# Patient Record
Sex: FEMALE | Race: WHITE | Employment: FULL TIME | URBAN - METROPOLITAN AREA
[De-identification: names, ages, dates, MRNs, and addresses within clinical notes are randomized per-mention and may not be internally consistent; named-entity substitution may affect disease eponyms.]

---

## 2022-03-24 ENCOUNTER — HOSPITAL ENCOUNTER (OUTPATIENT)
Age: 32
Discharge: HOME | End: 2022-03-24
Payer: COMMERCIAL

## 2022-03-24 LAB
ABO/RH: NORMAL
ANTIBODY SCREEN: NORMAL
HCT VFR BLD CALC: 24.9 % (ref 36–48)
HEMATOLOGY PATH CONSULT: YES
HEMOGLOBIN: 7.5 G/DL (ref 12–16)
MCH RBC QN AUTO: 18.4 PG (ref 26–34)
MCHC RBC AUTO-ENTMCNC: 30.3 G/DL (ref 31–36)
MCV RBC AUTO: 60.6 FL (ref 80–100)
PDW BLD-RTO: 18.7 % (ref 12.4–15.4)
PLATELET # BLD: 365 K/UL (ref 135–450)
PLATELET SLIDE REVIEW: ADEQUATE
PMV BLD AUTO: 7.9 FL (ref 5–10.5)
RBC # BLD: 4.11 M/UL (ref 4–5.2)
SLIDE REVIEW: ABNORMAL
WBC # BLD: 7.3 K/UL (ref 4–11)

## 2022-03-24 PROCEDURE — 86850 RBC ANTIBODY SCREEN: CPT

## 2022-03-24 PROCEDURE — 85027 COMPLETE CBC AUTOMATED: CPT

## 2022-03-24 PROCEDURE — 36415 COLL VENOUS BLD VENIPUNCTURE: CPT

## 2022-03-24 PROCEDURE — 86901 BLOOD TYPING SEROLOGIC RH(D): CPT

## 2022-03-24 PROCEDURE — 86900 BLOOD TYPING SEROLOGIC ABO: CPT

## 2022-03-25 LAB — HEMATOLOGY PATH CONSULT: NORMAL

## 2022-04-19 ENCOUNTER — HOSPITAL ENCOUNTER (EMERGENCY)
Age: 32
Discharge: HOME | End: 2022-04-20
Payer: COMMERCIAL

## 2022-04-19 VITALS
TEMPERATURE: 97.8 F | RESPIRATION RATE: 20 BRPM | OXYGEN SATURATION: 99 % | SYSTOLIC BLOOD PRESSURE: 107 MMHG | HEART RATE: 91 BPM | WEIGHT: 215 LBS | DIASTOLIC BLOOD PRESSURE: 66 MMHG

## 2022-04-19 DIAGNOSIS — R31.9 URINARY TRACT INFECTION WITH HEMATURIA, SITE UNSPECIFIED: ICD-10-CM

## 2022-04-19 DIAGNOSIS — N39.0 URINARY TRACT INFECTION WITH HEMATURIA, SITE UNSPECIFIED: ICD-10-CM

## 2022-04-19 DIAGNOSIS — N93.9 ABNORMAL VAGINAL BLEEDING: Primary | ICD-10-CM

## 2022-04-19 LAB
A/G RATIO: 1.6 (ref 1.1–2.2)
ALBUMIN SERPL-MCNC: 4.5 G/DL (ref 3.4–5)
ALP BLD-CCNC: 96 U/L (ref 40–129)
ALT SERPL-CCNC: 20 U/L (ref 10–40)
AMORPHOUS: ABNORMAL /HPF
ANION GAP SERPL CALCULATED.3IONS-SCNC: 12 MMOL/L (ref 3–16)
ANISOCYTOSIS: ABNORMAL
AST SERPL-CCNC: 16 U/L (ref 15–37)
BACTERIA: ABNORMAL /HPF
BASOPHILS ABSOLUTE: 0 K/UL (ref 0–0.2)
BASOPHILS RELATIVE PERCENT: 0.3 %
BILIRUB SERPL-MCNC: 0.3 MG/DL (ref 0–1)
BILIRUBIN URINE: NEGATIVE
BLOOD, URINE: ABNORMAL
BUN BLDV-MCNC: 14 MG/DL (ref 7–20)
CALCIUM SERPL-MCNC: 9.1 MG/DL (ref 8.3–10.6)
CHLORIDE BLD-SCNC: 100 MMOL/L (ref 99–110)
CLARITY: CLEAR
CO2: 21 MMOL/L (ref 21–32)
COLOR: YELLOW
CREAT SERPL-MCNC: 0.7 MG/DL (ref 0.6–1.1)
EOSINOPHILS ABSOLUTE: 0 K/UL (ref 0–0.6)
EOSINOPHILS RELATIVE PERCENT: 0.2 %
EPITHELIAL CELLS, UA: ABNORMAL /HPF (ref 0–5)
GFR AFRICAN AMERICAN: >60
GFR NON-AFRICAN AMERICAN: >60
GLUCOSE BLD-MCNC: 150 MG/DL (ref 70–99)
GLUCOSE URINE: NEGATIVE MG/DL
GONADOTROPIN, CHORIONIC (HCG) QUANT: <5 MIU/ML
HCT VFR BLD CALC: 27.2 % (ref 36–48)
HEMATOLOGY PATH CONSULT: NO
HEMOGLOBIN: 8.2 G/DL (ref 12–16)
KETONES, URINE: NEGATIVE MG/DL
LEUKOCYTE ESTERASE, URINE: NEGATIVE
LYMPHOCYTES ABSOLUTE: 1.8 K/UL (ref 1–5.1)
LYMPHOCYTES RELATIVE PERCENT: 16.7 %
MACROCYTES: ABNORMAL
MCH RBC QN AUTO: 20.1 PG (ref 26–34)
MCHC RBC AUTO-ENTMCNC: 30.3 G/DL (ref 31–36)
MCV RBC AUTO: 66.3 FL (ref 80–100)
MICROSCOPIC EXAMINATION: YES
MONOCYTES ABSOLUTE: 0.5 K/UL (ref 0–1.3)
MONOCYTES RELATIVE PERCENT: 4.4 %
NEUTROPHILS ABSOLUTE: 8.6 K/UL (ref 1.7–7.7)
NEUTROPHILS RELATIVE PERCENT: 78.4 %
NITRITE, URINE: NEGATIVE
OVALOCYTES: ABNORMAL
PDW BLD-RTO: 26.8 % (ref 12.4–15.4)
PH UA: 5.5 (ref 5–8)
PLATELET # BLD: 476 K/UL (ref 135–450)
PLATELET SLIDE REVIEW: ABNORMAL
PMV BLD AUTO: 8.4 FL (ref 5–10.5)
POIKILOCYTES: ABNORMAL
POLYCHROMASIA: ABNORMAL
POTASSIUM SERPL-SCNC: 3.9 MMOL/L (ref 3.5–5.1)
PROTEIN UA: NEGATIVE MG/DL
RBC # BLD: 4.11 M/UL (ref 4–5.2)
RBC UA: ABNORMAL /HPF (ref 0–4)
SLIDE REVIEW: ABNORMAL
SODIUM BLD-SCNC: 133 MMOL/L (ref 136–145)
SPECIFIC GRAVITY UA: >=1.03 (ref 1–1.03)
SPECIMEN STATUS: NORMAL
SPHEROCYTES: ABNORMAL
TOTAL PROTEIN: 7.3 G/DL (ref 6.4–8.2)
URINE TYPE: ABNORMAL
UROBILINOGEN, URINE: 0.2 E.U./DL
WBC # BLD: 10.9 K/UL (ref 4–11)
WBC UA: ABNORMAL /HPF (ref 0–5)

## 2022-04-19 PROCEDURE — 85025 COMPLETE CBC W/AUTO DIFF WBC: CPT

## 2022-04-19 PROCEDURE — 80053 COMPREHEN METABOLIC PANEL: CPT

## 2022-04-19 PROCEDURE — 81001 URINALYSIS AUTO W/SCOPE: CPT

## 2022-04-19 PROCEDURE — 6370000000 HC RX 637 (ALT 250 FOR IP): Performed by: PHYSICIAN ASSISTANT

## 2022-04-19 PROCEDURE — 99284 EMERGENCY DEPT VISIT MOD MDM: CPT

## 2022-04-19 PROCEDURE — 84702 CHORIONIC GONADOTROPIN TEST: CPT

## 2022-04-19 PROCEDURE — 2580000003 HC RX 258: Performed by: PHYSICIAN ASSISTANT

## 2022-04-19 PROCEDURE — 87086 URINE CULTURE/COLONY COUNT: CPT

## 2022-04-19 RX ORDER — CEPHALEXIN 250 MG/1
500 CAPSULE ORAL 2 TIMES DAILY
Qty: 20 CAPSULE | Refills: 0 | Status: SHIPPED | OUTPATIENT
Start: 2022-04-19 | End: 2022-04-24

## 2022-04-19 RX ORDER — CEPHALEXIN 250 MG/1
500 CAPSULE ORAL EVERY 6 HOURS SCHEDULED
Status: DISCONTINUED | OUTPATIENT
Start: 2022-04-20 | End: 2022-04-20 | Stop reason: HOSPADM

## 2022-04-19 RX ORDER — 0.9 % SODIUM CHLORIDE 0.9 %
1000 INTRAVENOUS SOLUTION INTRAVENOUS ONCE
Status: COMPLETED | OUTPATIENT
Start: 2022-04-19 | End: 2022-04-20

## 2022-04-19 RX ADMIN — CEPHALEXIN 500 MG: 250 CAPSULE ORAL at 23:10

## 2022-04-19 RX ADMIN — SODIUM CHLORIDE 1000 ML: 9 INJECTION, SOLUTION INTRAVENOUS at 21:17

## 2022-04-19 ASSESSMENT — PAIN SCALES - GENERAL: PAINLEVEL_OUTOF10: 3

## 2022-04-20 NOTE — ED PROVIDER NOTES
[facility] Emergency Department    CHIEF COMPLAINT  Abdominal Pain (pt comes from home for vaginal bleeding since mingbuary was adnitted last week for low hbg, today bleeding through over night pad ever 2 hours, complaints of dizziness)      SHARED SERVICE VISIT  Evaluated by MARTÍN. My supervising physician was available for consultation. HISTORY OF PRESENT ILLNESS  Selwyn Kilgore is a 27 y.o. female who presents to the ED complaining of episode of abdominal pain. Patient dropped off by her wife today for evaluation. Patient reports history of uterine fibroids and states that she has had some heavy vaginal bleeding intermittently since February. Reports that she was seen and evaluated last week at local hospital where she received blood and iron transfusions. Reports that bleeding has persisted. She has been seen and evaluated by her obstetrician who placed her on birth control to attempt to regulate periods although that was unsuccessful. States that she was also recently placed on progesterone without improvement of bleeding. Does have a obstetrician appointment scheduled for 830 tomorrow morning. Reports that she did have some very sharp and stabbing periumbilical pain earlier today which caused her to vomit. States that that has resolved and she is currently asymptomatic. No diarrhea or constipation. She has had no urinary symptoms. Denies fevers or chills. Short of breath with exertion. Occasional lightheadedness when standing. No headaches or confusion. No other complaints, modifying factors or associated symptoms. Nursing notes reviewed. No past medical history on file. No past surgical history on file. No family history on file.   Social History     Socioeconomic History    Marital status:      Spouse name: Not on file    Number of children: Not on file    Years of education: Not on file    Highest education level: Not on file   Occupational History    Not on file   Tobacco Use    Smoking status: Not on file    Smokeless tobacco: Not on file   Substance and Sexual Activity    Alcohol use: Not on file    Drug use: Not on file    Sexual activity: Not on file   Other Topics Concern    Not on file   Social History Narrative    Not on file     Social Determinants of Health     Financial Resource Strain:     Difficulty of Paying Living Expenses: Not on file   Food Insecurity:     Worried About Running Out of Food in the Last Year: Not on file    David of Food in the Last Year: Not on file   Transportation Needs:     Lack of Transportation (Medical): Not on file    Lack of Transportation (Non-Medical): Not on file   Physical Activity:     Days of Exercise per Week: Not on file    Minutes of Exercise per Session: Not on file   Stress:     Feeling of Stress : Not on file   Social Connections:     Frequency of Communication with Friends and Family: Not on file    Frequency of Social Gatherings with Friends and Family: Not on file    Attends Judaism Services: Not on file    Active Member of [address] [place] [address] or Organizations: Not on file    Attends Club or Organization Meetings: Not on file    Marital Status: Not on file   Intimate Partner Violence:     Fear of Current or Ex-Partner: Not on file    Emotionally Abused: Not on file    Physically Abused: Not on file    Sexually Abused: Not on file   Housing Stability:     Unable to Pay for Housing in the Last Year: Not on file    Number of [place] in the Last Year: Not on file    Unstable Housing in the Last Year: Not on file     Current Facility-Administered Medications   Medication Dose Route Frequency Provider Last Rate Last Admin    0.9 % sodium chloride bolus  1,000 mL IntraVENous Once MARY Robert 1,000 mL/hr at 04/19/22 2117 1,000 mL at 04/19/22 2117     No current outpatient medications on file.      Allergies   Allergen Reactions    Codeine Dizziness or Vertigo       REVIEW OF SYSTEMS  10 systems reviewed, pertinent positives per HPI otherwise noted to be negative    PHYSICAL EXAM  BP (!) 141/91   Pulse 113   Temp 97.8 °F (36.6 °C) (Oral)   Resp 18   Wt 215 lb (97.5 kg)   SpO2 100%   GENERAL APPEARANCE: Awake and alert. Cooperative. No acute distress. HEAD: Normocephalic. Atraumatic. EYES: PERRL. EOM's grossly intact. ENT: Mucous membranes are moist.   NECK: Supple. No JVD. No tracheal tenderness or deviation. No crepitus. HEART: Tachycardic. No murmurs. LUNGS: Respirations unlabored. CTAB. Good air exchange. Speaking comfortably in full sentences. No wheezing, rhonchi, rales. ABDOMEN: Soft. Non-distended. Mild generalized nonfocal abdominal tenderness without rigidity, guarding or rebound. Negative Childs's, McBurney's and Rovsing's. No fluids or ascites. No hernias or masses. Bowel sounds normal quadrants. No CVA tenderness. EXTREMITIES: No peripheral edema. Moves all extremities equally. All extremities neurovascularly intact. SKIN: Warm and dry. No acute rashes. NEUROLOGICAL: Alert and oriented. CN's 2-12 intact. No gross facial drooping. Strength 5/5, sensation intact. PSYCHIATRIC: Normal mood and affect. ED COURSE  Pain control was not required while here in the emergency department. Triage vitals showing some tachycardia pulse rate 113. Remainder vitals stable. A CBC with an H&H at 8.2 and 27.2 respectively. CMP unremarkable. Lipase normal.  hCG negative. Urinalysis showing 6-9 white blood cells with 1+ bacteria. Urine cultures pending. Given dose of Keflex. We will at this time treat UTI. As for patient's ongoing abnormal uterine bleeding there is no evidence for anemia requiring transfusions. Tachycardia has improved status post fluid infusion. Patient has follow-up with her obstetrician tomorrow. Will be discharged home with recommendations to keep that appointment.   We have discussed return precautions otherwise patient is in agreement and comfortable with discharge. Discussion was had with Ms. Hammad Carrillo regarding abnormal uterine bleeding, ED findings and recommendations for follow-up. Risk management discussed and shared decision making had with patient and/or surrogate. All questions were answered. Patient will follow up with OB/GYN tomorrow for further evaluation/treatment. All questions answered. Patient will return to ED for new/worsening symptoms. No medications prescribed at discharge.     MDM  Results for orders placed or performed during the hospital encounter of 04/19/22   CBC with Auto Differential   Result Value Ref Range    WBC 10.9 4.0 - 11.0 K/uL    RBC 4.11 4.00 - 5.20 M/uL    Hemoglobin 8.2 (L) 12.0 - 16.0 g/dL    Hematocrit 27.2 (L) 36.0 - 48.0 %    MCV 66.3 (L) 80.0 - 100.0 fL    MCH 20.1 (L) 26.0 - 34.0 pg    MCHC 30.3 (L) 31.0 - 36.0 g/dL    RDW 26.8 (H) 12.4 - 15.4 %    Platelets 167 (H) 236 - 450 K/uL    MPV 8.4 5.0 - 10.5 fL    PLATELET SLIDE REVIEW Increased     SLIDE REVIEW see below     Path Consult No     Neutrophils % 78.4 %    Lymphocytes % 16.7 %    Monocytes % 4.4 %    Eosinophils % 0.2 %    Basophils % 0.3 %    Neutrophils Absolute 8.6 (H) 1.7 - 7.7 K/uL    Lymphocytes Absolute 1.8 1.0 - 5.1 K/uL    Monocytes Absolute 0.5 0.0 - 1.3 K/uL    Eosinophils Absolute 0.0 0.0 - 0.6 K/uL    Basophils Absolute 0.0 0.0 - 0.2 K/uL    Anisocytosis 1+ (A)     Macrocytes Occasional (A)     Polychromasia Occasional (A)     Poikilocytes Occasional (A)     Ovalocytes Occasional (A)     Spherocytes Occasional (A)    Comprehensive Metabolic Panel   Result Value Ref Range    Sodium 133 (L) 136 - 145 mmol/L    Potassium 3.9 3.5 - 5.1 mmol/L    Chloride 100 99 - 110 mmol/L    CO2 21 21 - 32 mmol/L    Anion Gap 12 3 - 16    Glucose 150 (H) 70 - 99 mg/dL    BUN 14 7 - 20 mg/dL    CREATININE 0.7 0.6 - 1.1 mg/dL    GFR Non-African American >60 >60    GFR African American >60 >60    Calcium 9.1 8.3 - 10.6 mg/dL    Total Protein 7.3 6.4 - 8.2 g/dL    Albumin 4.5 3.4 - 5.0 g/dL    Albumin/Globulin Ratio 1.6 1.1 - 2.2    Total Bilirubin 0.3 0.0 - 1.0 mg/dL    Alkaline Phosphatase 96 40 - 129 U/L    ALT 20 10 - 40 U/L    AST 16 15 - 37 U/L   Sample possible blood bank testing   Result Value Ref Range    Specimen Status AZCHARY    Urinalysis   Result Value Ref Range    Color, UA Yellow Straw/Yellow    Clarity, UA Clear Clear    Glucose, Ur Negative Negative mg/dL    Bilirubin Urine Negative Negative    Ketones, Urine Negative Negative mg/dL    Specific Gravity, UA >=1.030 1.005 - 1.030    Blood, Urine LARGE (A) Negative    pH, UA 5.5 5.0 - 8.0    Protein, UA Negative Negative mg/dL    Urobilinogen, Urine 0.2 <2.0 E.U./dL    Nitrite, Urine Negative Negative    Leukocyte Esterase, Urine Negative Negative    Microscopic Examination YES     Urine Type NotGiven    HCG, Quantitative, Pregnancy   Result Value Ref Range    hCG Quant <5.0 <5.0 mIU/mL   Microscopic Urinalysis   Result Value Ref Range    WBC, UA 6-9 (A) 0 - 5 /HPF    RBC, UA  (A) 0 - 4 /HPF    Epithelial Cells, UA 2-5 0 - 5 /HPF    Bacteria, UA 1+ (A) None Seen /HPF    Amorphous, UA 1+ /HPF     I estimate there is LOW risk for ACUTE APPENDICITIS, BOWEL OBSTRUCTION, CHOLECYSTITIS, DIVERTICULITIS, INCARCERATED HERNIA, PANCREATITIS, PELVIC INFLAMMATORY DISEASE, PERFORATED BOWEL or ULCER, PREGNANCY, or TUBO-OVARIAN ABSCESS, thus I consider the discharge disposition reasonable. Also, there is no evidence or peritonitis, sepsis, or toxicity. Odalis Amaro and I have discussed the diagnosis and risks, and we agree with discharging home to follow-up with their primary doctor. We also discussed returning to the Emergency Department immediately if new or worsening symptoms occur. We have discussed the symptoms which are most concerning (e.g., bloody stool, fever, changing or worsening pain, vomiting) that necessitate immediate return. Final Impression  1. Abnormal vaginal bleeding    2. Urinary tract infection with hematuria, site unspecified      Blood pressure 107/66, pulse 91, temperature 97.8 °F (36.6 °C), temperature source Oral, resp. rate 20, weight 215 lb (97.5 kg), SpO2 99 %. DISPOSITION  Patient was discharged to home in good condition.           Evelia Alcaraz  04/20/22 0002

## 2022-04-21 LAB — URINE CULTURE, ROUTINE: NORMAL

## 2022-04-21 RX ORDER — TRANEXAMIC ACID 650 1/1
1300 TABLET ORAL 3 TIMES DAILY
COMMUNITY
Start: 2022-04-18 | End: 2022-04-21

## 2022-04-21 RX ORDER — MEDROXYPROGESTERONE ACETATE 10 MG/1
20 TABLET ORAL 3 TIMES DAILY
COMMUNITY
Start: 2022-04-11 | End: 2022-04-21

## 2022-04-21 NOTE — PROGRESS NOTES
Amanda Chiquito    Age 27 y.o.    female    1990    MRN [id number]    4/27/2022  Arrival Time_____________  OR Time____________[address]     Procedure(s):  DILATATION AND CURETTAGE, HYSTEROSCOPY WITH MYOSURE SAMPLING                      General   Surgeon(s):  Clementina Mora, MD      DAY ADMIT ___  SDS/OP ___  OUTPT IN BED ___        Phone [phone] (home)     PCP _____________________ Phone_________________ Epic ( ) Epic CE ( ) Appt ________    ADDITIONAL INFO __________________________________ Cardio/Consult _____________    NOTES _____________________________________________________________________    ____________________________________________________________________________    PAT APPT DATE:________ TIME: ________  FAXED QAD: _______  (__) H&P w/ Hospitalist  __________________________________________________________________________  Preop Nurse phone screen complete: _____________  (__) CBC     (__) W/ DIFF ___________     (__) Hgb A1C    ___________  (__) CHEST X RAY   __________  (__) LIPID PROFILE  ___________  (__) EKG   __________  (__) PT/PTT   ___________  (__) PFT's   __________  (__) BMP   ___________  (__) CAROTIDS  __________  (__) CMP   ___________  (__) VEIN MAPPING  __________  (__) U/A   ___________  (__) HISTORY & PHYSICAL __________  (__) URINE C & S  ___________  (__) CARDIAC CLEARANCE __________  (__) U/A W/ FLEX  ___________  (__) PULM.  CLEARANCE __________  (__) SERUM PREGNANCY ___________  (__) Check Epic DOS orders __________  (__) TYPE & SCREEN __________repeat ( ) (__)  __________________ __________  (__) ALBUMIN Viola Oppenheim ___________  (__)  __________________ __________  (__) TRANSFERRIN  ___________  (__)  __________________ __________  (__) LIVER PROFILE  ___________  (__)  __________________ __________  (__) MRSA NASAL SWAB ___________  (__) URINE PREG DOS __________  (__) SED RATE  ___________  (__) BLOOD SUGAR DOS __________  (__) C-REACTIVE PROTEIN ___________    (__) VITAMIN D HYDROXY ___________  (__) BLOOD THINNERS __________    (__) ACE/ ARBS: _____________________     (__) BETABLOCKERS __________________

## 2022-04-21 NOTE — PROGRESS NOTES
Obstructive Sleep Apnea (DENNYS) Screening     Patient:  Liban Handy    YOB: 1990      Medical Record #:  [id number]                     Date:  4/21/2022     1. Are you a loud and/or regular snorer? [x]  Yes       [] No    2. Have you been observed to gasp or stop breathing during sleep? []  Yes       [x] No    3. Do you feel tired or groggy upon awakening or do you awaken with a headache?           []  Yes       [x] No    4. Are you often tired or fatigued during the wake time hours? [x]  Yes       [] No    5. Do you fall asleep sitting, reading, watching TV or driving? []  Yes       [x] No    6. Do you often have problems with memory or concentration? []  Yes       [x] No    If patient's DENNYS score if greater than or equal to 3, they are considered high risk for DENNYS. An anesthesia provider will evaluate the patient and develop a plan of care the day of surgery. Note:  If the patient's BMI is more than 35 kg m¯² , has neck circumference > 40 cm, and/or high blood pressure the risk is greater (© American Sleep Apnea Association, 2006).

## 2022-04-22 ENCOUNTER — ANESTHESIA EVENT (OUTPATIENT)
Dept: OPERATING ROOM | Age: 32
End: 2022-04-22
Payer: COMMERCIAL

## 2022-04-26 NOTE — ANESTHESIA PRE PROCEDURE
Department of Anesthesiology  Preprocedure Note       Name:  Omayra Ambrose   Age:  27 y.o.  :  1990                                          MRN:  [id number]         Date:  2022      Surgeon: Alicia Ortiz):  Robson Devine MD    Procedure: Procedure(s):  DILATATION AND CURETTAGE, HYSTEROSCOPY WITH MYOSURE SAMPLING    Medications prior to admission:   Prior to Admission medications    Medication Sig Start Date End Date Taking? Authorizing Provider   Norgestimate-Eth Estradiol (SPRINTEC 28 PO) Take 1 tablet by mouth 3 times daily   Yes Historical Provider, MD       Current medications:    No current facility-administered medications for this encounter. Current Outpatient Medications   Medication Sig Dispense Refill    Norgestimate-Eth Estradiol (SPRINTEC 28 PO) Take 1 tablet by mouth 3 times daily         Allergies: Allergies   Allergen Reactions    Codeine Dizziness or Vertigo       Problem List:  There is no problem list on file for this patient.       Past Medical History:        Diagnosis Date    Asthma     No medications currently; allergy induced    History of blood transfusion     2 units PRBC -2022       Past Surgical History:        Procedure Laterality Date    DILATION AND CURETTAGE OF UTERUS      4 years ago    TONSILLECTOMY         Social History:    Social History     Tobacco Use    Smoking status: Never Smoker    Smokeless tobacco: Never Used   Substance Use Topics    Alcohol use: Yes     Comment: occasional                                 Counseling given: Not Answered      Vital Signs (Current):   Vitals:    22 1307   Weight: 218 lb (98.9 kg)   Height: 5' 4\" (1.626 m)                                              BP Readings from Last 3 Encounters:   22 107/66       NPO Status:                                                                                 BMI:   Wt Readings from Last 3 Encounters:   22 218 lb (98.9 kg)   22 215 lb (97.5 kg) Body mass index is 37.42 kg/m². CBC:   Lab Results   Component Value Date    WBC 10.9 04/19/2022    RBC 4.11 04/19/2022    HGB 8.2 04/19/2022    HCT 27.2 04/19/2022    MCV 66.3 04/19/2022    RDW 26.8 04/19/2022     04/19/2022       CMP:   Lab Results   Component Value Date     04/19/2022    K 3.9 04/19/2022     04/19/2022    CO2 21 04/19/2022    BUN 14 04/19/2022    CREATININE 0.7 04/19/2022    GFRAA >60 04/19/2022    AGRATIO 1.6 04/19/2022    LABGLOM >60 04/19/2022    GLUCOSE 150 04/19/2022    PROT 7.3 04/19/2022    CALCIUM 9.1 04/19/2022    BILITOT 0.3 04/19/2022    ALKPHOS 96 04/19/2022    AST 16 04/19/2022    ALT 20 04/19/2022       POC Tests: No results for input(s): POCGLU, POCNA, POCK, POCCL, POCBUN, POCHEMO, POCHCT in the last 72 hours. Coags: No results found for: PROTIME, INR, APTT    HCG (If Applicable): No results found for: PREGTESTUR, PREGSERUM, HCG, HCGQUANT     ABGs: No results found for: PHART, PO2ART, BAA6TWQ, XKP0YKJ, BEART, [id number]     Type & Screen (If Applicable):  No results found for: LABABO, LABRH    Drug/Infectious Status (If Applicable):  No results found for: HIV, HEPCAB    COVID-19 Screening (If Applicable): No results found for: COVID19        Anesthesia Evaluation  Patient summary reviewed and Nursing notes reviewed no history of anesthetic complications:   Airway: Mallampati: II     Neck ROM: full   Dental:          Pulmonary:Negative Pulmonary ROS and normal exam    (+) asthma:                            Cardiovascular:Negative CV ROS                      Neuro/Psych:   Negative Neuro/Psych ROS              GI/Hepatic/Renal: Neg GI/Hepatic/Renal ROS       (-) hiatal hernia and GERD       Endo/Other: Negative Endo/Other ROS                    Abdominal:             Vascular: Other Findings:             Anesthesia Plan      general     ASA 3     (I discussed with the patient the risks and benefits of PIV, general anesthesia, IV Narcotics, PACU.   All questions were answered the patient agrees with the plan and wishes to proceed.  )  Induction: intravenous. Pre-Operative Diagnosis: Menorrhagia with regular cycle [N92.0]    27 y.o.   BMI:  Body mass index is 37.42 kg/m².      Vitals:    04/21/22 1307   Weight: 218 lb (98.9 kg)   Height: 5' 4\" (1.626 m)       Allergies   Allergen Reactions    Codeine Dizziness or Vertigo       Social History     Tobacco Use    Smoking status: Never Smoker    Smokeless tobacco: Never Used   Substance Use Topics    Alcohol use: Yes     Comment: occasional        LABS:    CBC  Lab Results   Component Value Date/Time    WBC 10.9 04/19/2022 07:34 PM    HGB 8.2 (L) 04/19/2022 07:34 PM    HCT 27.2 (L) 04/19/2022 07:34 PM     (H) 04/19/2022 07:34 PM     RENAL  Lab Results   Component Value Date/Time     (L) 04/19/2022 07:34 PM    K 3.9 04/19/2022 07:34 PM     04/19/2022 07:34 PM    CO2 21 04/19/2022 07:34 PM    BUN 14 04/19/2022 07:34 PM    CREATININE 0.7 04/19/2022 07:34 PM    GLUCOSE 150 (H) 04/19/2022 07:34 PM     COAGS  No results found for: PROTIME, INR, APTT      Kyle Muniz MD   4/26/2022

## 2022-04-27 ENCOUNTER — ANESTHESIA (OUTPATIENT)
Dept: OPERATING ROOM | Age: 32
End: 2022-04-27
Payer: COMMERCIAL

## 2022-04-27 ENCOUNTER — HOSPITAL ENCOUNTER (OUTPATIENT)
Age: 32
Setting detail: OUTPATIENT SURGERY
Discharge: HOME | End: 2022-04-27
Attending: OBSTETRICS & GYNECOLOGY | Admitting: OBSTETRICS & GYNECOLOGY
Payer: COMMERCIAL

## 2022-04-27 VITALS
SYSTOLIC BLOOD PRESSURE: 106 MMHG | OXYGEN SATURATION: 100 % | RESPIRATION RATE: 11 BRPM | TEMPERATURE: 97 F | DIASTOLIC BLOOD PRESSURE: 54 MMHG

## 2022-04-27 VITALS
WEIGHT: 218 LBS | RESPIRATION RATE: 27 BRPM | TEMPERATURE: 96.4 F | OXYGEN SATURATION: 100 % | DIASTOLIC BLOOD PRESSURE: 67 MMHG | HEIGHT: 64 IN | HEART RATE: 105 BPM | SYSTOLIC BLOOD PRESSURE: 117 MMHG | BODY MASS INDEX: 37.22 KG/M2

## 2022-04-27 DIAGNOSIS — N92.0 MENORRHAGIA WITH REGULAR CYCLE: ICD-10-CM

## 2022-04-27 LAB
ABO/RH: NORMAL
ANION GAP SERPL CALCULATED.3IONS-SCNC: 11 MMOL/L (ref 3–16)
ANTIBODY SCREEN: NORMAL
BUN BLDV-MCNC: 11 MG/DL (ref 7–20)
CALCIUM SERPL-MCNC: 9.7 MG/DL (ref 8.3–10.6)
CHLORIDE BLD-SCNC: 103 MMOL/L (ref 99–110)
CO2: 23 MMOL/L (ref 21–32)
CREAT SERPL-MCNC: 0.8 MG/DL (ref 0.6–1.1)
GFR AFRICAN AMERICAN: >60
GFR NON-AFRICAN AMERICAN: >60
GLUCOSE BLD-MCNC: 96 MG/DL (ref 70–99)
HCT VFR BLD CALC: 23.7 % (ref 36–48)
HEMATOLOGY PATH CONSULT: NO
HEMOGLOBIN: 7.2 G/DL (ref 12–16)
MCH RBC QN AUTO: 20.2 PG (ref 26–34)
MCHC RBC AUTO-ENTMCNC: 30.5 G/DL (ref 31–36)
MCV RBC AUTO: 66.3 FL (ref 80–100)
PDW BLD-RTO: 25 % (ref 12.4–15.4)
PLATELET # BLD: 494 K/UL (ref 135–450)
PMV BLD AUTO: 7.9 FL (ref 5–10.5)
POTASSIUM REFLEX MAGNESIUM: 3.7 MMOL/L (ref 3.5–5.1)
PREGNANCY, URINE: NEGATIVE
RBC # BLD: 3.57 M/UL (ref 4–5.2)
SODIUM BLD-SCNC: 137 MMOL/L (ref 136–145)
WBC # BLD: 8.1 K/UL (ref 4–11)

## 2022-04-27 PROCEDURE — 2500000003 HC RX 250 WO HCPCS: Performed by: ANESTHESIOLOGY

## 2022-04-27 PROCEDURE — 7100000011 HC PHASE II RECOVERY - ADDTL 15 MIN: Performed by: OBSTETRICS & GYNECOLOGY

## 2022-04-27 PROCEDURE — 86901 BLOOD TYPING SEROLOGIC RH(D): CPT

## 2022-04-27 PROCEDURE — 2580000003 HC RX 258: Performed by: ANESTHESIOLOGY

## 2022-04-27 PROCEDURE — 2720000010 HC SURG SUPPLY STERILE: Performed by: OBSTETRICS & GYNECOLOGY

## 2022-04-27 PROCEDURE — 7100000010 HC PHASE II RECOVERY - FIRST 15 MIN: Performed by: OBSTETRICS & GYNECOLOGY

## 2022-04-27 PROCEDURE — 2709999900 HC NON-CHARGEABLE SUPPLY: Performed by: OBSTETRICS & GYNECOLOGY

## 2022-04-27 PROCEDURE — 7100000001 HC PACU RECOVERY - ADDTL 15 MIN: Performed by: OBSTETRICS & GYNECOLOGY

## 2022-04-27 PROCEDURE — 2580000003 HC RX 258: Performed by: OBSTETRICS & GYNECOLOGY

## 2022-04-27 PROCEDURE — 7100000000 HC PACU RECOVERY - FIRST 15 MIN: Performed by: OBSTETRICS & GYNECOLOGY

## 2022-04-27 PROCEDURE — 3600000013 HC SURGERY LEVEL 3 ADDTL 15MIN: Performed by: OBSTETRICS & GYNECOLOGY

## 2022-04-27 PROCEDURE — 86850 RBC ANTIBODY SCREEN: CPT

## 2022-04-27 PROCEDURE — 3600000003 HC SURGERY LEVEL 3 BASE: Performed by: OBSTETRICS & GYNECOLOGY

## 2022-04-27 PROCEDURE — 6360000002 HC RX W HCPCS: Performed by: ANESTHESIOLOGY

## 2022-04-27 PROCEDURE — 80048 BASIC METABOLIC PNL TOTAL CA: CPT

## 2022-04-27 PROCEDURE — 3700000001 HC ADD 15 MINUTES (ANESTHESIA): Performed by: OBSTETRICS & GYNECOLOGY

## 2022-04-27 PROCEDURE — 3700000000 HC ANESTHESIA ATTENDED CARE: Performed by: OBSTETRICS & GYNECOLOGY

## 2022-04-27 PROCEDURE — 85027 COMPLETE CBC AUTOMATED: CPT

## 2022-04-27 PROCEDURE — 86900 BLOOD TYPING SEROLOGIC ABO: CPT

## 2022-04-27 PROCEDURE — 88305 TISSUE EXAM BY PATHOLOGIST: CPT

## 2022-04-27 PROCEDURE — A4217 STERILE WATER/SALINE, 500 ML: HCPCS | Performed by: OBSTETRICS & GYNECOLOGY

## 2022-04-27 PROCEDURE — 84703 CHORIONIC GONADOTROPIN ASSAY: CPT

## 2022-04-27 RX ORDER — SUCCINYLCHOLINE CHLORIDE 20 MG/ML
INJECTION INTRAMUSCULAR; INTRAVENOUS PRN
Status: DISCONTINUED | OUTPATIENT
Start: 2022-04-27 | End: 2022-04-27 | Stop reason: SDUPTHER

## 2022-04-27 RX ORDER — DEXAMETHASONE SODIUM PHOSPHATE 10 MG/ML
INJECTION INTRAMUSCULAR; INTRAVENOUS PRN
Status: DISCONTINUED | OUTPATIENT
Start: 2022-04-27 | End: 2022-04-27 | Stop reason: SDUPTHER

## 2022-04-27 RX ORDER — OXYCODONE HYDROCHLORIDE 5 MG/1
5 TABLET ORAL
Status: DISCONTINUED | OUTPATIENT
Start: 2022-04-27 | End: 2022-04-27 | Stop reason: HOSPADM

## 2022-04-27 RX ORDER — SODIUM CHLORIDE 9 MG/ML
INJECTION, SOLUTION INTRAVENOUS PRN
Status: DISCONTINUED | OUTPATIENT
Start: 2022-04-27 | End: 2022-04-27 | Stop reason: HOSPADM

## 2022-04-27 RX ORDER — OXYCODONE HYDROCHLORIDE 5 MG/1
5 TABLET ORAL PRN
Status: DISCONTINUED | OUTPATIENT
Start: 2022-04-27 | End: 2022-04-27 | Stop reason: HOSPADM

## 2022-04-27 RX ORDER — DIPHENHYDRAMINE HYDROCHLORIDE 50 MG/ML
6.25 INJECTION INTRAMUSCULAR; INTRAVENOUS
Status: DISCONTINUED | OUTPATIENT
Start: 2022-04-27 | End: 2022-04-27 | Stop reason: HOSPADM

## 2022-04-27 RX ORDER — SODIUM CHLORIDE 0.9 % (FLUSH) 0.9 %
5-40 SYRINGE (ML) INJECTION EVERY 12 HOURS SCHEDULED
Status: DISCONTINUED | OUTPATIENT
Start: 2022-04-27 | End: 2022-04-27 | Stop reason: HOSPADM

## 2022-04-27 RX ORDER — ONDANSETRON 2 MG/ML
INJECTION INTRAMUSCULAR; INTRAVENOUS PRN
Status: DISCONTINUED | OUTPATIENT
Start: 2022-04-27 | End: 2022-04-27 | Stop reason: SDUPTHER

## 2022-04-27 RX ORDER — SODIUM CHLORIDE 0.9 % (FLUSH) 0.9 %
5-40 SYRINGE (ML) INJECTION PRN
Status: DISCONTINUED | OUTPATIENT
Start: 2022-04-27 | End: 2022-04-27 | Stop reason: HOSPADM

## 2022-04-27 RX ORDER — MEPERIDINE HYDROCHLORIDE 50 MG/ML
12.5 INJECTION INTRAMUSCULAR; INTRAVENOUS; SUBCUTANEOUS EVERY 5 MIN PRN
Status: DISCONTINUED | OUTPATIENT
Start: 2022-04-27 | End: 2022-04-27 | Stop reason: HOSPADM

## 2022-04-27 RX ORDER — SODIUM CHLORIDE, SODIUM LACTATE, POTASSIUM CHLORIDE, CALCIUM CHLORIDE 600; 310; 30; 20 MG/100ML; MG/100ML; MG/100ML; MG/100ML
INJECTION, SOLUTION INTRAVENOUS CONTINUOUS
Status: DISCONTINUED | OUTPATIENT
Start: 2022-04-27 | End: 2022-04-27 | Stop reason: HOSPADM

## 2022-04-27 RX ORDER — FAMOTIDINE 10 MG/ML
20 INJECTION, SOLUTION INTRAVENOUS ONCE
Status: COMPLETED | OUTPATIENT
Start: 2022-04-27 | End: 2022-04-27

## 2022-04-27 RX ORDER — ROCURONIUM BROMIDE 10 MG/ML
INJECTION, SOLUTION INTRAVENOUS PRN
Status: DISCONTINUED | OUTPATIENT
Start: 2022-04-27 | End: 2022-04-27 | Stop reason: SDUPTHER

## 2022-04-27 RX ORDER — ONDANSETRON 2 MG/ML
4 INJECTION INTRAMUSCULAR; INTRAVENOUS EVERY 30 MIN PRN
Status: DISCONTINUED | OUTPATIENT
Start: 2022-04-27 | End: 2022-04-27 | Stop reason: HOSPADM

## 2022-04-27 RX ORDER — MIDAZOLAM HYDROCHLORIDE 1 MG/ML
2 INJECTION INTRAMUSCULAR; INTRAVENOUS
Status: DISCONTINUED | OUTPATIENT
Start: 2022-04-27 | End: 2022-04-27 | Stop reason: HOSPADM

## 2022-04-27 RX ORDER — SODIUM CHLORIDE, SODIUM LACTATE, POTASSIUM CHLORIDE, CALCIUM CHLORIDE 600; 310; 30; 20 MG/100ML; MG/100ML; MG/100ML; MG/100ML
INJECTION, SOLUTION INTRAVENOUS CONTINUOUS PRN
Status: DISCONTINUED | OUTPATIENT
Start: 2022-04-27 | End: 2022-04-27 | Stop reason: SDUPTHER

## 2022-04-27 RX ORDER — LABETALOL HYDROCHLORIDE 5 MG/ML
5 INJECTION, SOLUTION INTRAVENOUS EVERY 10 MIN PRN
Status: DISCONTINUED | OUTPATIENT
Start: 2022-04-27 | End: 2022-04-27 | Stop reason: HOSPADM

## 2022-04-27 RX ORDER — OXYCODONE HYDROCHLORIDE 5 MG/1
10 TABLET ORAL PRN
Status: DISCONTINUED | OUTPATIENT
Start: 2022-04-27 | End: 2022-04-27 | Stop reason: HOSPADM

## 2022-04-27 RX ORDER — MAGNESIUM HYDROXIDE 1200 MG/15ML
LIQUID ORAL CONTINUOUS PRN
Status: COMPLETED | OUTPATIENT
Start: 2022-04-27 | End: 2022-04-27

## 2022-04-27 RX ORDER — SODIUM CHLORIDE 9 MG/ML
25 INJECTION, SOLUTION INTRAVENOUS PRN
Status: DISCONTINUED | OUTPATIENT
Start: 2022-04-27 | End: 2022-04-27 | Stop reason: HOSPADM

## 2022-04-27 RX ORDER — FENTANYL CITRATE 50 UG/ML
INJECTION, SOLUTION INTRAMUSCULAR; INTRAVENOUS PRN
Status: DISCONTINUED | OUTPATIENT
Start: 2022-04-27 | End: 2022-04-27 | Stop reason: SDUPTHER

## 2022-04-27 RX ORDER — PROPOFOL 10 MG/ML
INJECTION, EMULSION INTRAVENOUS PRN
Status: DISCONTINUED | OUTPATIENT
Start: 2022-04-27 | End: 2022-04-27 | Stop reason: SDUPTHER

## 2022-04-27 RX ORDER — ONDANSETRON 2 MG/ML
4 INJECTION INTRAMUSCULAR; INTRAVENOUS
Status: COMPLETED | OUTPATIENT
Start: 2022-04-27 | End: 2022-04-27

## 2022-04-27 RX ORDER — DIPHENHYDRAMINE HYDROCHLORIDE 50 MG/ML
12.5 INJECTION INTRAMUSCULAR; INTRAVENOUS
Status: DISCONTINUED | OUTPATIENT
Start: 2022-04-27 | End: 2022-04-27 | Stop reason: HOSPADM

## 2022-04-27 RX ADMIN — HYDROMORPHONE HYDROCHLORIDE 0.5 MG: 1 INJECTION, SOLUTION INTRAMUSCULAR; INTRAVENOUS; SUBCUTANEOUS at 11:43

## 2022-04-27 RX ADMIN — ONDANSETRON 4 MG: 2 INJECTION INTRAMUSCULAR; INTRAVENOUS at 10:54

## 2022-04-27 RX ADMIN — FENTANYL CITRATE 100 MCG: 50 INJECTION, SOLUTION INTRAMUSCULAR; INTRAVENOUS at 08:47

## 2022-04-27 RX ADMIN — DEXAMETHASONE SODIUM PHOSPHATE 10 MG: 10 INJECTION INTRAMUSCULAR; INTRAVENOUS at 09:07

## 2022-04-27 RX ADMIN — PROPOFOL 200 MG: 10 INJECTION, EMULSION INTRAVENOUS at 08:35

## 2022-04-27 RX ADMIN — SUGAMMADEX 200 MG: 100 INJECTION, SOLUTION INTRAVENOUS at 09:50

## 2022-04-27 RX ADMIN — HYDROMORPHONE HYDROCHLORIDE 0.25 MG: 1 INJECTION, SOLUTION INTRAMUSCULAR; INTRAVENOUS; SUBCUTANEOUS at 11:03

## 2022-04-27 RX ADMIN — SODIUM CHLORIDE, SODIUM LACTATE, POTASSIUM CHLORIDE, AND CALCIUM CHLORIDE: .6; .31; .03; .02 INJECTION, SOLUTION INTRAVENOUS at 08:22

## 2022-04-27 RX ADMIN — SUCCINYLCHOLINE CHLORIDE 120 MG: 20 INJECTION, SOLUTION INTRAMUSCULAR; INTRAVENOUS at 08:35

## 2022-04-27 RX ADMIN — ROCURONIUM BROMIDE 5 MG: 10 SOLUTION INTRAVENOUS at 08:35

## 2022-04-27 RX ADMIN — ONDANSETRON 4 MG: 2 INJECTION INTRAMUSCULAR; INTRAVENOUS at 10:19

## 2022-04-27 RX ADMIN — ONDANSETRON 4 MG: 2 INJECTION INTRAMUSCULAR; INTRAVENOUS at 09:07

## 2022-04-27 RX ADMIN — ONDANSETRON 4 MG: 2 INJECTION INTRAMUSCULAR; INTRAVENOUS at 11:08

## 2022-04-27 RX ADMIN — FENTANYL CITRATE 150 MCG: 50 INJECTION, SOLUTION INTRAMUSCULAR; INTRAVENOUS at 08:34

## 2022-04-27 RX ADMIN — FAMOTIDINE 20 MG: 10 INJECTION, SOLUTION INTRAVENOUS at 08:22

## 2022-04-27 ASSESSMENT — PULMONARY FUNCTION TESTS
PIF_VALUE: 18
PIF_VALUE: 20
PIF_VALUE: 30
PIF_VALUE: 18
PIF_VALUE: 18
PIF_VALUE: 33
PIF_VALUE: 16
PIF_VALUE: 16
PIF_VALUE: 18
PIF_VALUE: 18
PIF_VALUE: 4
PIF_VALUE: 18
PIF_VALUE: 16
PIF_VALUE: 36
PIF_VALUE: 16
PIF_VALUE: 16
PIF_VALUE: 18
PIF_VALUE: 2
PIF_VALUE: 30
PIF_VALUE: 16
PIF_VALUE: 18
PIF_VALUE: 20
PIF_VALUE: 2
PIF_VALUE: 4
PIF_VALUE: 18
PIF_VALUE: 20
PIF_VALUE: 2
PIF_VALUE: 30
PIF_VALUE: 0
PIF_VALUE: 18
PIF_VALUE: 16
PIF_VALUE: 18
PIF_VALUE: 18
PIF_VALUE: 7
PIF_VALUE: 16
PIF_VALUE: 18
PIF_VALUE: 16
PIF_VALUE: 18
PIF_VALUE: 5
PIF_VALUE: 16
PIF_VALUE: 18
PIF_VALUE: 16
PIF_VALUE: 18
PIF_VALUE: 18
PIF_VALUE: 16
PIF_VALUE: 18
PIF_VALUE: 16
PIF_VALUE: 19
PIF_VALUE: 16
PIF_VALUE: 2
PIF_VALUE: 18
PIF_VALUE: 1
PIF_VALUE: 18
PIF_VALUE: 2
PIF_VALUE: 7
PIF_VALUE: 18
PIF_VALUE: 16
PIF_VALUE: 34
PIF_VALUE: 16
PIF_VALUE: 20
PIF_VALUE: 2
PIF_VALUE: 18
PIF_VALUE: 18

## 2022-04-27 ASSESSMENT — PAIN SCALES - GENERAL
PAINLEVEL_OUTOF10: 8
PAINLEVEL_OUTOF10: 9

## 2022-04-27 ASSESSMENT — PAIN - FUNCTIONAL ASSESSMENT
PAIN_FUNCTIONAL_ASSESSMENT: NONE - DENIES PAIN
PAIN_FUNCTIONAL_ASSESSMENT: 0-10

## 2022-04-27 NOTE — H&P
I have reviewed the history and physical and examined the patient and find no relevant changes. I have reviewed with the patient and/or family the risks, benefits, and alternatives to the procedure.     Date of original H&P: 4/20/2022    Sara Roberts MD

## 2022-04-27 NOTE — ANESTHESIA POSTPROCEDURE EVALUATION
Department of Anesthesiology  Postprocedure Note    Patient: Vitaliy Lamb  MRN: [id number]  YOB: 1990  Date of evaluation: 4/27/2022  Time:  12:04 PM     Procedure Summary     Date: 04/27/22 Room / Location: [place] [address] / [facility]    Anesthesia Start: 0830 Anesthesia Stop: 3950    Procedure: DILATATION AND CURETTAGE, HYSTEROSCOPY WITH MYOSURE SAMPLING (N/A ) Diagnosis:       Menorrhagia with regular cycle      (MENORRHAGIA)    Surgeons: Gifty Rocha MD Responsible Provider: Belinda Tam MD    Anesthesia Type: general ASA Status: 3          Anesthesia Type: general    Raissa Phase I: Raissa Score: 10    Raissa Phase II:      Last vitals: Reviewed and per EMR flowsheets.        Anesthesia Post Evaluation    Comments: Postoperative Anesthesia Note    Name:    Vitaliy Lamb  MRN:      [id number]    Patient Vitals in the past 12 hrs:  04/27/22 1000, Temp:96.4 °F (35.8 °C), Temp src:Tympanic  04/27/22 0720, BP:128/72, Temp:98.7 °F (37.1 °C), Temp src:Temporal, Pulse:107, Resp:16, SpO2:100 %     LABS:    CBC  Lab Results       Component                Value               Date/Time                  WBC                      8.1                 04/27/2022 07:30 AM        HGB                      7.2 (L)             04/27/2022 07:30 AM        HCT                      23.7 (L)            04/27/2022 07:30 AM        PLT                      494 (H)             04/27/2022 07:30 AM   RENAL  Lab Results       Component                Value               Date/Time                  NA                       137                 04/27/2022 07:30 AM        K                        3.7                 04/27/2022 07:30 AM        CL                       103                 04/27/2022 07:30 AM        CO2                      23                  04/27/2022 07:30 AM        BUN                      11                  04/27/2022 07:30 AM        CREATININE               0.8                 04/27/2022 07:30 AM        GLUCOSE                  96                  04/27/2022 07:30 AM   COAGS  No results found for: PROTIME, INR, APTT    Intake & Output: In: 1200 (I.V.:1200)  Out: 20     Nausea & Vomiting:  No    Level of Consciousness:  Awake    Pain Assessment:  Adequate analgesia    Anesthesia Complications:  No apparent anesthetic complications    SUMMARY      Vital signs stable  OK to discharge from Stage I post anesthesia care.   Care transferred from Anesthesiology department on discharge from perioperative area

## 2022-04-27 NOTE — PROGRESS NOTES
Pt brought to PACU. Report obtained from [address] [place] [address] and anesthesia. Pt placed on monitor with room air. Will continue to assess.

## 2022-04-27 NOTE — OP NOTE
Operative Note      Patient: Dea Sánchez  YOB: 1990  MRN: [id number]    Date of Procedure: 4/27/2022    Pre-Op Diagnosis: MENORRHAGIA    Post-Op Diagnosis: Same, 3-4cm endometrial mass- suspect fibroid       Procedure(s):  DILATATION AND CURETTAGE, HYSTEROSCOPY WITH MYOSURE SAMPLING    Surgeon(s):  Edmar Mccall MD       Assistant:   Alona Degroot MD  Surgical Assistant: Hank Anderson    Anesthesia: General    Estimated Blood Loss (mL): 57VI    Complications: None    Specimens:   ID Type Source Tests Collected by Time Destination   A : ENDOMETRIAL CURETTINGS Tissue Endometrium SURGICAL PATHOLOGY Josse Johansen MD 4/27/2022 1261    B : ENDOMETRIAL MASS Tissue Endometrium SURGICAL PATHOLOGY Josse Johansen MD 4/27/2022 0940        Implants:  * No implants in log *      Drains:   [REMOVED] Urinary Catheter Other (comment) (Removed)       Findings: uterus enlarged, irregular, on hysteroscopy 3-4cm endometrial mass within endometrial cavity- suspect fibroid. [place] not visualized.  Uterus sounded to 9cm    Dictation#: [id number]    Electronically signed by Josse Johansen MD on 4/27/2022 at 9:50 AM

## 2022-04-27 NOTE — OP NOTE
[address], [place] [address]                                OPERATIVE REPORT    PATIENT NAME: Yennifer Del Real                      :        1990  MED REC NO:   [id number]                          ROOM:  ACCOUNT NO:   [de-identified]                           ADMIT DATE: 2022  PROVIDER:     Nellie Mosqueda MD    DATE OF PROCEDURE:  2022    PREOPERATIVE DIAGNOSIS:  Menorrhagia. POSTOPERATIVE DIAGNOSES:  1. Menorrhagia. 2.  Also 3 to 4 cm endometrial mass suspicious for fibroid. PROCEDURES PERFORMED:  Hysteroscopy, D and C with MyoSure sampling. SURGEON:  Nellie Mosqueda MD    ASSISTANT:  Blanche Garcia MD, did come in to assist.    ANESTHESIA:  General.    ESTIMATED BLOOD LOSS:  10 mL. COMPLICATIONS:  None. SPECIMENS:  Endometrial mass and endometrial curettings sent separately. FINDINGS:  Uterus enlarged, irregular on hysteroscopy, 3 to 4 cm  endometrial mass within endometrial cavity, suspect fibroid. [place] not  visualized. Did attempt removal with MyoSure and polyp forceps, which  was unsuccessful. INDICATIONS AND CONSENT:  The patient is a 70-year-old G0 who presented  with heavy uterine bleeding. She had failed OCP taper and did go to the  emergency room at [facility], where she did receive blood transfusion and iron  transfusion and did have a pelvic ultrasound, which showed a fibroid  uterus with multiple small fibroids. Endometrium was not well  visualized. The patient then did a trial of Provera and bleeding was  still uncontrolled with Provera. At this time, she was counseled on  hysteroscopy, D and C for further evaluation. Counseled on all risks of  surgery including bleeding, infection, risk of injury to surrounding  organs such as bladder and bowel. The patient stated understanding and  desired to proceed. Also discussed uterine perforation.     OPERATIVE PROCEDURE:  The patient was taken to the operating room, where  general anesthesia was induced and found to be adequate. The patient  was prepped and draped in the dorsal supine position with legs in  candy-cane stirrups. After a time-out was performed, a single-tooth  tenaculum was placed in the anterior lip of the cervix. The uterus  sounded to 9 cm. The cervix was dilated. The hysteroscope was then  inserted into the uterine cavity and a 3 to 4 cm endometrial mass was  noted, which appears to be a broad fundal posterior base, suspect  uterine fibroid. Leata Moron were not visualized secondary to the endometrial  mass obscuring the endometrial cavity. Attempt was used with a MyoSure. We did try to get some sampling of the mass, but was unable to break  down and remove with the MyoSure. Then the hysteroscope was removed and  attempt with the polyp forceps was done without success. endometrial curettage performed, all sampling to be sent to Pathology. All  instruments were removed from the vagina. The patient tolerated the  procedure well. Counts were correct x2. The patient was taken to the  recovery room in stable condition.         Say Mcdermott MD    D: 04/27/2022 9:57:06       T: 04/27/2022 10:55:36     [id number]  Job#: [id number]     Doc#: [id number]    CC:

## (undated) DEVICE — GLOVE SURG SZ 65 THK91MIL LTX FREE SYN POLYISOPRENE

## (undated) DEVICE — GAUZE,SPONGE,4"X4",16PLY,XRAY,STRL,LF: Brand: MEDLINE

## (undated) DEVICE — DEVICE TISS REM DIA3MM L25.25IN ENDOSCP F/ IU POLYPS

## (undated) DEVICE — D&C: Brand: MEDLINE INDUSTRIES, INC.

## (undated) DEVICE — Y-TYPE TUR/BLADDER IRRIGATION SET, REGULATING CLAMP